# Patient Record
Sex: FEMALE | Race: WHITE | URBAN - METROPOLITAN AREA
[De-identification: names, ages, dates, MRNs, and addresses within clinical notes are randomized per-mention and may not be internally consistent; named-entity substitution may affect disease eponyms.]

---
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
## 2023-07-22 ENCOUNTER — OFFICE VISIT (OUTPATIENT)
Dept: URBAN - METROPOLITAN AREA CLINIC 58 | Facility: CLINIC | Age: 18
End: 2023-07-22
Payer: COMMERCIAL

## 2023-07-22 DIAGNOSIS — H53.041 AMBLYOPIA SUSPECT, RIGHT EYE: ICD-10-CM

## 2023-07-22 DIAGNOSIS — H52.03 HYPERMETROPIA, BILATERAL: Primary | ICD-10-CM

## 2023-07-22 PROCEDURE — 92014 COMPRE OPH EXAM EST PT 1/>: CPT | Performed by: STUDENT IN AN ORGANIZED HEALTH CARE EDUCATION/TRAINING PROGRAM

## 2023-07-22 PROCEDURE — 92310 CONTACT LENS FITTING OU: CPT | Performed by: STUDENT IN AN ORGANIZED HEALTH CARE EDUCATION/TRAINING PROGRAM

## 2023-07-22 ASSESSMENT — VISUAL ACUITY
OD: 20/25
OS: 20/20

## 2023-07-22 ASSESSMENT — INTRAOCULAR PRESSURE
OD: 15
OS: 14

## 2023-07-22 NOTE — IMPRESSION/PLAN
Impression: Hypermetropia, bilateral: H52.03. Plan: Explained in detail, diagnosis with patient. New glasses prescription given today. Expires 1 year. Updated CL prescription given, expires 1 year. Patient educated on good CL hygiene and compliance. No swimming, showering, or sleeping in lenses. Replace lenses qday. If contact lenses cause irritation or redness, remove and RTC immediately. Trialed +1.00 OD CL. Pt did not notice much difference. Finalized +2.00 so as not to over plus.